# Patient Record
Sex: MALE | Race: WHITE | Employment: UNEMPLOYED | URBAN - METROPOLITAN AREA
[De-identification: names, ages, dates, MRNs, and addresses within clinical notes are randomized per-mention and may not be internally consistent; named-entity substitution may affect disease eponyms.]

---

## 2021-05-25 ENCOUNTER — APPOINTMENT (OUTPATIENT)
Dept: GENERAL RADIOLOGY | Age: 21
End: 2021-05-25
Attending: EMERGENCY MEDICINE
Payer: COMMERCIAL

## 2021-05-25 ENCOUNTER — HOSPITAL ENCOUNTER (EMERGENCY)
Age: 21
Discharge: HOME | End: 2021-05-26
Attending: EMERGENCY MEDICINE
Payer: COMMERCIAL

## 2021-05-25 DIAGNOSIS — S62.101A CLOSED FRACTURE OF RIGHT WRIST, INITIAL ENCOUNTER: Primary | ICD-10-CM

## 2021-05-25 PROCEDURE — 74011250637 HC RX REV CODE- 250/637: Performed by: EMERGENCY MEDICINE

## 2021-05-25 PROCEDURE — 73110 X-RAY EXAM OF WRIST: CPT

## 2021-05-25 PROCEDURE — 99285 EMERGENCY DEPT VISIT HI MDM: CPT

## 2021-05-25 PROCEDURE — 75810000301 HC ER LEVEL 1 CLOSED TREATMNT FRACTURE/DISLOCATION

## 2021-05-25 RX ORDER — LIDOCAINE HYDROCHLORIDE 10 MG/ML
10 INJECTION, SOLUTION EPIDURAL; INFILTRATION; INTRACAUDAL; PERINEURAL ONCE
Status: DISCONTINUED | OUTPATIENT
Start: 2021-05-25 | End: 2021-05-26 | Stop reason: HOSPADM

## 2021-05-25 RX ORDER — KETAMINE HYDROCHLORIDE 50 MG/ML
1 INJECTION, SOLUTION INTRAMUSCULAR; INTRAVENOUS ONCE
Status: COMPLETED | OUTPATIENT
Start: 2021-05-25 | End: 2021-05-26

## 2021-05-25 RX ORDER — OXYCODONE AND ACETAMINOPHEN 5; 325 MG/1; MG/1
1 TABLET ORAL
Status: COMPLETED | OUTPATIENT
Start: 2021-05-25 | End: 2021-05-25

## 2021-05-25 RX ADMIN — OXYCODONE HYDROCHLORIDE AND ACETAMINOPHEN 1 TABLET: 5; 325 TABLET ORAL at 23:36

## 2021-05-26 ENCOUNTER — APPOINTMENT (OUTPATIENT)
Dept: GENERAL RADIOLOGY | Age: 21
End: 2021-05-26
Attending: EMERGENCY MEDICINE
Payer: COMMERCIAL

## 2021-05-26 VITALS
WEIGHT: 155 LBS | TEMPERATURE: 98.2 F | HEIGHT: 72 IN | OXYGEN SATURATION: 96 % | BODY MASS INDEX: 20.99 KG/M2 | HEART RATE: 62 BPM | RESPIRATION RATE: 14 BRPM | DIASTOLIC BLOOD PRESSURE: 63 MMHG | SYSTOLIC BLOOD PRESSURE: 104 MMHG

## 2021-05-26 PROCEDURE — 73110 X-RAY EXAM OF WRIST: CPT

## 2021-05-26 PROCEDURE — 74011000250 HC RX REV CODE- 250: Performed by: EMERGENCY MEDICINE

## 2021-05-26 PROCEDURE — 73090 X-RAY EXAM OF FOREARM: CPT

## 2021-05-26 RX ADMIN — KETAMINE HYDROCHLORIDE 70.5 MG: 50 INJECTION, SOLUTION INTRAMUSCULAR; INTRAVENOUS at 01:19

## 2021-05-26 NOTE — ED PROVIDER NOTES
71-year-old male presenting ER with injury to his right wrist.  Patient was playing soccer when he fell into a wall having immediate pain and deformity of the wrist.  Patient denies any numbness any weakness. Pain worsens palpation and movement of the hand. No previous injuries to the wrist.  Patient denies any other injuries. Has iced that wrist but has not taken any medications. No past medical history on file. No past surgical history on file. No family history on file. Social History     Socioeconomic History    Marital status: SINGLE     Spouse name: Not on file    Number of children: Not on file    Years of education: Not on file    Highest education level: Not on file   Occupational History    Not on file   Tobacco Use    Smoking status: Not on file   Substance and Sexual Activity    Alcohol use: Not on file    Drug use: Not on file    Sexual activity: Not on file   Other Topics Concern    Not on file   Social History Narrative    Not on file     Social Determinants of Health     Financial Resource Strain:     Difficulty of Paying Living Expenses:    Food Insecurity:     Worried About Running Out of Food in the Last Year:     [address] in the Last Year:    Transportation Needs:     Lack of Transportation (Medical):  Lack of Transportation (Non-Medical):    Physical Activity:     Days of Exercise per Week:     Minutes of Exercise per Session:    Stress:     Feeling of Stress :    Social Connections:     Frequency of Communication with Friends and Family:     Frequency of Social Gatherings with Friends and Family:     Attends [employer] Services:     Active Member of Clubs or Organizations:     Attends Club or Organization Meetings:     Marital Status:    Intimate Partner Violence:     Fear of Current or Ex-Partner:     Emotionally Abused:     Physically Abused:     Sexually Abused: ALLERGIES: Patient has no known allergies.     Review of Systems   Constitutional: Negative for fever. Musculoskeletal: Positive for joint swelling. Skin: Negative for wound. Neurological: Negative for weakness and numbness. All other systems reviewed and are negative. Vitals:    05/25/21 2248   BP: (!) 103/56   Pulse: 92   Resp: 17   Temp: 98 °F (36.7 °C)   SpO2: 97%   Weight: 70.3 kg (155 lb)   Height: 6' (1.829 m)            Physical Exam  Constitutional:       Appearance: Normal appearance. HENT:      Head: Normocephalic and atraumatic. Nose: Nose normal.      Mouth/Throat:      Pharynx: Oropharynx is clear. Eyes:      Conjunctiva/sclera: Conjunctivae normal.   Cardiovascular:      Rate and Rhythm: Normal rate and regular rhythm. Pulses:           Radial pulses are 2+ on the right side. Pulmonary:      Effort: Pulmonary effort is normal. No respiratory distress. Musculoskeletal:         General: Swelling, tenderness, deformity and signs of injury present. Right upper arm: Normal.      Right elbow: Normal.      Right forearm: Tenderness present. No swelling, deformity or lacerations. Right wrist: Swelling, deformity, tenderness and bony tenderness present. No crepitus. Decreased range of motion. Normal pulse. Cervical back: Neck supple. Skin:     General: Skin is warm. Capillary Refill: Capillary refill takes less than 2 seconds. Coloration: Skin is not pale. Findings: No rash. Neurological:      General: No focal deficit present. Mental Status: He is alert. MDM  Number of Diagnoses or Management Options  Closed fracture of right wrist, initial encounter  Diagnosis management comments: Patient with fracture of left wrist.  Comminuted fracture with displacement angulated. Performed procedural sedation and fracture reduction. Patient recovered well from procedure. Improved alignment. Images sent to orthopedics perfect serve.   We will have patient follow-up outpatient with plans for outpatient surgery. Amount and/or Complexity of Data Reviewed  Tests in the radiology section of CPT®: reviewed           Procedural Sedation  Performed by: Rivera Delacruz MD  Authorized by: Rivera Delacruz MD     Consent:     Consent obtained:  Written and verbal    Consent given by:  Patient    Risks discussed:   Allergic reaction, dysrhythmia, inadequate sedation, nausea, vomiting, respiratory compromise necessitating ventilatory assistance and intubation, prolonged sedation necessitating reversal and prolonged hypoxia resulting in organ damage    Alternatives discussed:  Anxiolysis and analgesia without sedation  Indications:     Procedure performed:  Fracture reduction    Procedure necessitating sedation performed by:  Physician performing sedation    Intended level of sedation:  Moderate (conscious sedation)  Pre-sedation assessment:     Time since last food or drink:  4 hr ago    ASA classification: class 1 - normal, healthy patient      Neck mobility: normal      Mouth opening:  3 or more finger widths    Mallampati score:  I - soft palate, uvula, fauces, pillars visible    Pre-sedation assessments completed and reviewed: airway patency, cardiovascular function, hydration status, mental status, nausea/vomiting, pain level, respiratory function and temperature    Immediate pre-procedure details:     Reviewed: vital signs      Verified: bag valve mask available, emergency equipment available, intubation equipment available, IV patency confirmed, oxygen available and suction available    Procedure details (see MAR for exact dosages):     Preoxygenation:  Nasal cannula    Sedation:  Ketamine    Intra-procedure monitoring:  Continuous capnometry, frequent vital sign checks, frequent LOC assessments, continuous pulse oximetry, cardiac monitor and blood pressure monitoring    Intra-procedure events: none      Total sedation time (minutes):  15  Post-procedure details:     Attendance: Constant attendance by certified staff until patient recovered      Recovery: Patient returned to pre-procedure baseline      Estimated blood loss (see I/O flowsheets): no      Post-sedation assessments completed and reviewed: airway patency, cardiovascular function, hydration status, mental status, nausea/vomiting, pain level and respiratory function      Specimens recovered:  None    Patient is stable for discharge or admission: yes      Patient tolerance: Tolerated well, no immediate complications  Fracture reduction  Performed by: Sendy Amaya MD  Authorized by: Sendy Amaya MD     Consent:     Consent obtained:  Verbal and written    Consent given by:  Patient    Risks discussed:  Pain (Incomplete reduction, neurovascular compromise, worsened pain. Repeat procedure)    Alternatives discussed:  Alternative treatment and referral  Indications:     Indications:  Fracture deformity with angulation and displacement  Sedation:     Sedation type: Moderate (conscious) sedation  Post-procedure details:     Patient tolerance of procedure: Tolerated well, no immediate complications  Comments:      Traction countertraction with manipulation. Placement confirmed with C arm/fluoroscopy. Patient splinted with sugar tong with Ortho-Glass, padding and Ace wrap. Neurovascularly intact after splinting.   Patient tolerated procedure well

## 2021-05-26 NOTE — ED TRIAGE NOTES
Patient reports right wrist injury while playing [employer]asa. States \" I was fouled\". Reports this happening about 2230  Obvious deformity noted.  Strong radial pulses